# Patient Record
Sex: FEMALE | Race: WHITE | NOT HISPANIC OR LATINO | Employment: OTHER | URBAN - METROPOLITAN AREA
[De-identification: names, ages, dates, MRNs, and addresses within clinical notes are randomized per-mention and may not be internally consistent; named-entity substitution may affect disease eponyms.]

---

## 2022-09-25 ENCOUNTER — HOSPITAL ENCOUNTER (EMERGENCY)
Facility: CLINIC | Age: 72
Discharge: HOME | End: 2022-09-25
Attending: PHYSICIAN ASSISTANT | Admitting: PHYSICIAN ASSISTANT
Payer: MEDICARE

## 2022-09-25 ENCOUNTER — HOSPITAL ENCOUNTER (EMERGENCY)
Facility: CLINIC | Age: 72
Discharge: HOME | End: 2022-09-25
Attending: EMERGENCY MEDICINE | Admitting: EMERGENCY MEDICINE
Payer: MEDICARE

## 2022-09-25 VITALS
BODY MASS INDEX: 22.68 KG/M2 | HEART RATE: 97 BPM | WEIGHT: 128 LBS | SYSTOLIC BLOOD PRESSURE: 107 MMHG | TEMPERATURE: 98.1 F | DIASTOLIC BLOOD PRESSURE: 65 MMHG | OXYGEN SATURATION: 96 % | HEIGHT: 63 IN | RESPIRATION RATE: 18 BRPM

## 2022-09-25 VITALS
SYSTOLIC BLOOD PRESSURE: 118 MMHG | TEMPERATURE: 98.1 F | DIASTOLIC BLOOD PRESSURE: 57 MMHG | OXYGEN SATURATION: 98 % | HEART RATE: 109 BPM | RESPIRATION RATE: 16 BRPM

## 2022-09-25 DIAGNOSIS — R04.0 EPISTAXIS: ICD-10-CM

## 2022-09-25 DIAGNOSIS — R04.0 NASAL BLEEDING: ICD-10-CM

## 2022-09-25 LAB
BASOPHILS # BLD AUTO: 0.1 10E3/UL (ref 0–0.2)
BASOPHILS NFR BLD AUTO: 1 %
EOSINOPHIL # BLD AUTO: 0 10E3/UL (ref 0–0.7)
EOSINOPHIL NFR BLD AUTO: 0 %
ERYTHROCYTE [DISTWIDTH] IN BLOOD BY AUTOMATED COUNT: 14.7 % (ref 10–15)
HCT VFR BLD AUTO: 37.7 % (ref 35–47)
HGB BLD-MCNC: 12.3 G/DL (ref 11.7–15.7)
IMM GRANULOCYTES # BLD: 0 10E3/UL
IMM GRANULOCYTES NFR BLD: 0 %
LYMPHOCYTES # BLD AUTO: 1.5 10E3/UL (ref 0.8–5.3)
LYMPHOCYTES NFR BLD AUTO: 20 %
MCH RBC QN AUTO: 29.8 PG (ref 26.5–33)
MCHC RBC AUTO-ENTMCNC: 32.6 G/DL (ref 31.5–36.5)
MCV RBC AUTO: 91 FL (ref 78–100)
MONOCYTES # BLD AUTO: 0.7 10E3/UL (ref 0–1.3)
MONOCYTES NFR BLD AUTO: 9 %
NEUTROPHILS # BLD AUTO: 5.5 10E3/UL (ref 1.6–8.3)
NEUTROPHILS NFR BLD AUTO: 70 %
NRBC # BLD AUTO: 0 10E3/UL
NRBC BLD AUTO-RTO: 0 /100
PLATELET # BLD AUTO: 264 10E3/UL (ref 150–450)
RBC # BLD AUTO: 4.13 10E6/UL (ref 3.8–5.2)
WBC # BLD AUTO: 7.9 10E3/UL (ref 4–11)

## 2022-09-25 PROCEDURE — 999N000104 HC STATISTIC NO CHARGE

## 2022-09-25 PROCEDURE — 250N000013 HC RX MED GY IP 250 OP 250 PS 637: Performed by: EMERGENCY MEDICINE

## 2022-09-25 PROCEDURE — 99284 EMERGENCY DEPT VISIT MOD MDM: CPT | Mod: 25

## 2022-09-25 PROCEDURE — 36415 COLL VENOUS BLD VENIPUNCTURE: CPT | Performed by: EMERGENCY MEDICINE

## 2022-09-25 PROCEDURE — 30901 CONTROL OF NOSEBLEED: CPT | Mod: LT

## 2022-09-25 PROCEDURE — 85025 COMPLETE CBC W/AUTO DIFF WBC: CPT | Performed by: EMERGENCY MEDICINE

## 2022-09-25 RX ORDER — TRAMADOL HYDROCHLORIDE 50 MG/1
50 TABLET ORAL ONCE
Status: COMPLETED | OUTPATIENT
Start: 2022-09-25 | End: 2022-09-25

## 2022-09-25 RX ORDER — ACETAMINOPHEN 500 MG
1000 TABLET ORAL ONCE
Status: DISCONTINUED | OUTPATIENT
Start: 2022-09-25 | End: 2022-09-26 | Stop reason: HOSPADM

## 2022-09-25 RX ORDER — ACETAMINOPHEN 325 MG/1
650 TABLET ORAL ONCE
Status: COMPLETED | OUTPATIENT
Start: 2022-09-25 | End: 2022-09-25

## 2022-09-25 RX ADMIN — ACETAMINOPHEN 650 MG: 325 TABLET, FILM COATED ORAL at 13:03

## 2022-09-25 RX ADMIN — TRAMADOL HYDROCHLORIDE 50 MG: 50 TABLET, COATED ORAL at 22:38

## 2022-09-25 ASSESSMENT — ACTIVITIES OF DAILY LIVING (ADL)
ADLS_ACUITY_SCORE: 33
ADLS_ACUITY_SCORE: 33
ADLS_ACUITY_SCORE: 35

## 2022-09-25 ASSESSMENT — ENCOUNTER SYMPTOMS: HEADACHES: 1

## 2022-09-25 NOTE — ED PROVIDER NOTES
"  History   Chief Complaint:  Epistaxis       HPI   Kathy Peñaloza is a 72 year old female, on Eliquis, with history of  CAD, A-fib, and hypertension who presents with a constant nose bleed for the past 3 weeks. She presented to the ED about 2 hours ago and was evaluated by Dr. Boggs. A Miracell was placed into the left nares earlier today which was tolerated well by the patient. She presented to the ED again because her bleeding started again and it is getting worse. She does not feel comfortable going home. She has had multiple ENT appointments and has seen many doctors and she is not able to tolerate anymore. She has not taken her Eliquis for a couple days.     Review of Systems   HENT: Positive for nosebleeds.    All other systems reviewed and are negative.    Allergies:  Diphtheria,Pertussis (Acellular),Tetanus Vaccine  Hydrocodone-Acetaminophen  Pineapple  Pneumococcal vaccine  Zoster vaccine live  Ivp Dye [Contrast Dye]  Morphine Hcl  Gabapentin  Lactose     Medications:  Alprazolam  Aspirin 81 mg  Flaxseed, Linseed,  Fluticasone nasal spray  Glucosamine-Chondroitin  Hydrochlorothiazide  Losartan  Rosuvastatin  Pantoprazole  Metoprolol succinate  Spironolactone  Apixaban  Hydroxyzine HCL  Furosemide  Clopidogrel     Past Medical History:     Hypertension  Aortic valve disorder  Irregular heart rhythm  Anxiety  MAGDA  Aneurysm  Hyperlipidemia  Osteoarthritis  GERD  CAD  Arthritis     Past Surgical History:    Mastectomy bilateral  IR angiogram  EGD  Laparoscopic cholecystectomy     Family History:    Mother: CAD  Father: CAD, diabetes  Siblings: CAD x2     Social History:  The patient presents to the ED with   PCP: Negrito Cevallos     Physical Exam     Patient Vitals for the past 24 hrs:   BP Temp Temp src Pulse Resp SpO2 Height Weight   09/25/22 1748 107/65 98.1  F (36.7  C) Oral 97 18 96 % 1.6 m (5' 3\") 58.1 kg (128 lb)       Physical Exam   General: Well appearing, pleasant, resting on exam " bed  HEENT: No evidence of trauma.  Conjunctive are clear. Neck range of motion intact.  Nose and throat clear. Balloon in left nare. No bleeding in posterior oropharynx. Airway intact.  Respiratory: Good effort  Cardiovascular: Good distal perfusion  Gastrointestinal: Nondistended  Musculoskeletal: Atraumatic  Skin: Exposed skin clear.  Neurologic: Alert.  Psych:  Patient is cooperative, with normal affect.    Emergency Department Course     Emergency Department Course:     Reviewed:  I reviewed nursing notes, vitals, past medical history and Care Everywhere    Assessments:  1818 I obtained history and examined the patient as noted above.     1836 I rechecked the patient and explained findings.     Disposition:  The patient was discharged to home.     Impression & Plan     Medical Decision Making:  Kathy Peñaloza is a 72 year old female presents emergency room today for evaluation of nosebleed which has been intermittent for the past 3 weeks and return since she was home from the emergency room today.  See HPI.  Her vitals are unremarkable.  She was here earlier today and had a normal hemoglobin.  She has a balloon in place that was placed today.  She has ENT follow-up tomorrow.  There is no indication for admission currently.  She has been holding her Eliquis.  She is to return with new or worsening symptoms.  She is quite frustrated unfortunately however I do not feel removing the balloon or any other intervention is indicated currently.  Her airway is intact.    Diagnosis:    ICD-10-CM    1. Epistaxis  R04.0        Scribe Disclosure:  Nolvia YEBOAH, am serving as a scribe at 5:47 PM on 9/25/2022 to document services personally performed by Maxi Cosby PA-C based on my observations and the provider's statements to me.          Maxi Cosby PA-C  09/25/22 5838

## 2022-09-25 NOTE — ED TRIAGE NOTES
Third nose bleed this morning. Pt reports this has been going on for three weeks. On 2x thinners.     Triage Assessment     Row Name 09/25/22 1013       Triage Assessment (Adult)    Airway WDL WDL       Skin Circulation/Temperature WDL    Skin Circulation/Temperature WDL WDL       Cognitive/Neuro/Behavioral WDL    Cognitive/Neuro/Behavioral WDL WDL

## 2022-09-25 NOTE — ED PROVIDER NOTES
History   Chief Complaint:  Epistaxis       The history is provided by the patient.      Kathy Peñaloza is a 72 year old female with history of CAD, A-fib, and hypertension who presents with a nose bleed that she has been experiencing for 3 weeks. The bleeding is on the left side of her nose. She is also experiencing congestion and a headache. She presents to the ED because this is her 3rd nose bleed today. She has been seen multiple times for this ongoing issue and has seen and ENT specialist. She has an appointment with ENT tomorrow.    Review of Systems   HENT: Positive for congestion and nosebleeds.    Neurological: Positive for headaches.   All other systems reviewed and are negative.    Allergies:  Diphtheria,Pertussis (Acellular),Tetanus Vaccine  Hydrocodone-Acetaminophen  Pineapple  Pneumococcal vaccine  Zoster vaccine live  Ivp Dye [Contrast Dye]  Morphine Hcl  Gabapentin  Lactose    Medications:  Alprazolam  Aspirin 81 mg  Flaxseed, Linseed,  Fluticasone nasal spray  Glucosamine-Chondroitin  Hydrochlorothiazide  Losartan  Rosuvastatin  Pantoprazole  Metoprolol succinate  Spironolactone  Apixaban  Hydroxyzine HCL  Furosemide  Clopidogrel    Past Medical History:     Hypertension  Aortic valve disorder  Irregular heart rhythm  Anxiety  MAGDA  Aneurysm  Hyperlipidemia  Osteoarthritis  GERD  CAD  Arthritis    Past Surgical History:    Mastectomy bilateral  IR angiogram  EGD  Laparoscopic cholecystectomy    Family History:    Mother: CAD  Father: CAD, diabetes  Siblings: CAD x2    Social History:  The patient presents to the ED with her .  PCP: Negrito Cevallos     Physical Exam     Patient Vitals for the past 24 hrs:   BP Temp Temp src Pulse Resp SpO2   09/25/22 1057 118/57 -- -- 109 16 98 %   09/25/22 1014 131/74 98.1  F (36.7  C) Temporal 95 16 97 %       Physical Exam  Middle age white female sitting, anxious, no respiratory distress with nasal clamp on.  Head/neck: no facial tenderness, neck  supple, no adenopathy, oral pharynx clear.  Neuro: awake, alert, appropriate.    Emergency Department Course     Procedures  Nasal packing   I placed a 7.5 cm Miracell into the left nares, patient tolerated this well.    Emergency Department Course:     Reviewed:  I reviewed nursing notes, vitals, past medical history and Care Everywhere    Assessments:  1203 I obtained history and examined the patient as noted above.   1214 I performed the procedure.  1335 I rechecked the patient and explained treatment plan.    Interventions:  1303 Acetaminophen 650 mg PO    Disposition:  The patient was discharged to home.     Impression & Plan     Medical Decision Making:  This is a 72 year old with nose bleeds. She has had three nose bleeds today and has been experiencing them for the past several of weeks. She has been seen in the ER and ENT. She did have an MI and stent recently and is on Plavix and Eliquis because of previous paroxysmal A-fib. On exam now, there was no active bleeding. When I looked in her nose, I did not see any anterior lesions or any posterior blood. When I had her blow her nose, there was just a slight amount of blood tinge on the nose, there was none on the tissue, there was no oropharynx bleeding. I discussed with her that without any posterior or anterior bleeding now options were to simply watch or to place a posterior pad. She preferred to have something done. Patient is going to see her ENT tomorrow because this will only be in for one day, I have not started her on antibiotics. She will follow up tomorrow if she has recurrent bleeding, she will need to return to the ED. I will have her hold her Eliquis for the time being since this is not a valvular problem.     Diagnosis:    ICD-10-CM    1. Nasal bleeding  R04.0      Scribe Disclosure:  I, Tong Craig, am serving as a scribe at 12:03 PM on 9/25/2022 to document services personally performed by Chaka Boggs MD based on my observations and  the provider's statements to me.        Chaka Boggs MD  09/25/22 0519

## 2022-09-25 NOTE — ED TRIAGE NOTES
Patient was seen three hours ago for a nose bleed and told to return if it began to bleed again. Balloon is in place, actively bleeding.      Triage Assessment     Row Name 09/25/22 5212       Triage Assessment (Adult)    Airway WDL WDL       Respiratory WDL    Respiratory WDL WDL       Skin Circulation/Temperature WDL    Skin Circulation/Temperature WDL WDL       Cardiac WDL    Cardiac WDL WDL       Peripheral/Neurovascular WDL    Peripheral Neurovascular WDL WDL

## 2022-09-25 NOTE — ED PROVIDER NOTES
History   Chief Complaint:  Epistaxis       HPI   Kathy Peñaloza is a 72 year old female with history of *** who presents with ***.    Heart attack 3 weeks ago placed on 2 thinners after 3 stents placed  Silver nitrate made it worst  Increasing in frequency  Light headed +  Nauseous +  ENT appointment tomorrow      Review of Systems  ***    Allergies:  {EPPAF Allergies:938104}  Ivp Dye [Contrast Dye]  Morphine Hcl    Medications:  ***  ALPRAZolam (XANAX) 0.5 MG tablet  aspirin 81 MG tablet  Flaxseed, Linseed, (FLAX SEEDS PO)  fluticasone (FLONASE) 50 MCG/ACT nasal spray  Glucosamine-Chondroitin (GLUCOSAMINE CHONDR COMPLEX PO)  hydrochlorothiazide 12.5 MG TABS  losartan (COZAAR) 100 MG tablet  rosuvastatin (CRESTOR) 5 MG tablet        Past Medical History:     {EPPAFVMEDHX:107936}  Patient Active Problem List   Diagnosis     Hypertension goal BP (blood pressure) < 140/90     CARDIOVASCULAR SCREENING; LDL GOAL LESS THAN 130     Aortic valve disorder     Injury of right elbow     Advanced directives, counseling/discussion     Right Lateral epicondylitis     Personal history of malignant neoplasm of breast     Irregular heart rhythm     Primary localized osteoarthrosis, other specified sites     Chest pain     Anxiety        Past Surgical History:    {EPPAFV Surgicalhx:458897}  Past Surgical History:   Procedure Laterality Date     MASTECTOMY BILATERAL, INSERT TISSUE EXPANDER BILATERAL, COMBINED          Family History:    {EPPAFV famhx:518120}  Family History   Problem Relation Age of Onset     Heart Disease Mother      Heart Disease Father      Diabetes Father      Cerebrovascular Disease Brother      Heart Disease Brother      Heart Disease Sister        Social History:  {SOC HX:615470}  PCP: Negrito Cevallos   ***    Physical Exam     Patient Vitals for the past 24 hrs:   BP Temp Temp src Pulse Resp SpO2   09/25/22 1057 118/57 -- -- 109 16 98 %   09/25/22 1014 131/74 98.1  F (36.7  C) Temporal 95 16 97 %  "      Physical Exam  ***    Emergency Department Course   ECG  No results found for this or any previous visit.    Imaging:  No orders to display     Report per {read:376917}    Laboratory:  Labs Ordered and Resulted from Time of ED Arrival to Time of ED Departure - No data to display     Procedures  ***    Emergency Department Course:           Reviewed:  I reviewed {EDreview:655144}    Assessments:  *** I obtained history and examined the patient as noted above.   *** I rechecked the patient*** and explained findings***.   ***    Consults:  ***    Interventions:  ***    [facility]  [facility]    Impression & Plan     CMS Diagnoses: {Sepsis/Septic Shock/Stemi/Stroke:212787::\"None\"}  {FVTrauma:769074}    {[facility]:543814}      Medical Decision Making:  ***     Critical Care Time: was *** minutes for this patient excluding procedures    Diagnosis:  No diagnosis found.    Discharge Medications:  New Prescriptions    No medications on file       Scribe Disclosure:  I, Tong Craig, am serving as a scribe at 11:21 AM on 9/25/2022 to document services personally performed by Chaka Boggs MD*** based on my observations and the provider's statements to me.     ***    "

## 2022-09-26 NOTE — ED NOTES
Patient was discharged from fast track 10 minutes ago. Per patient, this is her 3rd visit to ER today for the same thing. Pt has ENT appointment tomorrow

## 2022-09-26 NOTE — ED PROVIDER NOTES
History   Chief Complaint:  Epistaxis    The history is provided by the patient.      Kathy Peñaloza is a 72 year old female on Plavix and Eliquis with history of STEMI, hypertension, anxiety who presents with resolved epistaxis. Kathy has been seen here in the [facility] emergency department twice today by Maxi Cosby PA-C and Chaka Boggs MD for epistaxis. Her bleeding subsided without packing after her first visit but returned as the bleeding re-began. Upon her second visit, her epistaxis was stopped via a nasal balloon. She explains that her nose bleed returned while on her way back to her hotel. Kathy describes a sensation of a blood clot in the back of her throat. Kathy shares concerns with taking Plavix following her STEMI one month ago. Since beginning Plavix one month ago, she has had chronic epistaxis for the past three weeks. First cardiology appointment in four days. She spoke with her internist regarding the Plavix and her nose bleeds; they suggested missing one dose then restarting her Plavix. Kathy feels that nose bleeds always begin pouring blood a couple hours after taking her Plavix. Her last dose of Plavix was twelve hours ago. She has been seeing an ENT for the past few weeks and has an appointment at 7:30 in the morning tomorrow.  She is also on Eliquis but has been holding this for the past few days.    Review of Systems   HENT: Positive for nosebleeds.    All other systems reviewed and are negative.    Allergies:  Diphtheria,Pertussis (Acellular),Tetanus Vaccine  Hydrocodone-Acetaminophen  Pineapple  Pneumococcal vaccine  Zoster vaccine live  Ivp Dye [Contrast Dye]  Morphine Hcl  Gabapentin  Lactose     Medications:  Alprazolam  Aspirin 81 mg  Flaxseed, Linseed,  Fluticasone nasal spray  Glucosamine-Chondroitin  Hydrochlorothiazide  Losartan  Rosuvastatin  Pantoprazole  Metoprolol succinate  Spironolactone  Apixaban  Hydroxyzine HCL  Furosemide  Clopidogrel     Past Medical  "History:     Hypertension  Aortic valve disorder  Irregular heart rhythm  Anxiety  MAGDA  Aneurysm  Hyperlipidemia  Osteoarthritis  GERD  CAD  Arthritis  STEMI     Past Surgical History:    Mastectomy bilateral  IR angiogram  EGD  Laparoscopic cholecystectomy     Family History:    Mother: CAD  Father: CAD, diabetes  Siblings: CAD x2     Social History:  The patient presents to the ED with   PCP: Jessica Lee MD    Physical Exam     Patient Vitals for the past 24 hrs:   BP Temp Temp src Pulse Resp SpO2 Height Weight   09/25/22 1748 107/65 98.1  F (36.7  C) Oral 97 18 96 % 1.6 m (5' 3\") 58.1 kg (128 lb)     Physical Exam  Vitals and nursing note reviewed.   Constitutional:       General: She is not in acute distress.     Appearance: She is not ill-appearing.   HENT:      Head: Normocephalic and atraumatic.      Right Ear: External ear normal.      Left Ear: External ear normal.      Nose:      Left Nostril: Foreign body (Rhino Rocket balloon present in the left nostril with small amount of serosanguineous oozing anteriorly) present.   Eyes:      Extraocular Movements: Extraocular movements intact.      Conjunctiva/sclera: Conjunctivae normal.   Pulmonary:      Effort: Pulmonary effort is normal. No respiratory distress.   Musculoskeletal:         General: No deformity or signs of injury.   Skin:     Coloration: Skin is not pale.      Findings: No rash.   Neurological:      Mental Status: She is alert.   Psychiatric:         Mood and Affect: Mood is anxious.         Behavior: Behavior normal.           Emergency Department Course   [facility]    -Epistaxis Mgmt    Date/Time: 9/25/2022 9:29 PM  Performed by: Rajiv Joel MD  Authorized by: Rajiv Joel MD     Risks, benefits and alternatives discussed.      ANESTHESIA (see MAR for exact dosages)     Anesthesia method:  None  PROCEDURE DETAILS     Treatment site:  L anterior    Treatment method:  Nasal tampon (7.5)    " Treatment episode: recurrence of recent bleed        POST PROCEDURE     Assessment:  Bleeding stopped    PROCEDURE    Patient Tolerance:  Patient tolerated the procedure well with no immediate complications    Emergency Department Course:       Reviewed:  I reviewed nursing notes, vitals, past medical history and Care Everywhere    Assessments:   I obtained history and examined the patient as noted above. I explained findings and discussed discharge with the patient. All questions answered.    I rechecked the patient.    I rechecked the patient. Kathy's nosebleed returned.    I completed the procedure at noted above.    I rechecked the patient.     Disposition:  The patient was discharged to home.     Impression & Plan     Medical Decision Makin-year-old female with recurrent epistaxis.  She has been seen twice in this emergency department already.  She had a 7 and half centimeter Rhino Rocket balloon placed on her last visit.  This initially stopped the bleeding but she had return of bleeding after being discharged.  On arrival she had just oozing from the anterior part of the left nostril.  I added approximately 2 mL of air to the balloon and we observed the patient in the ED.  She did have an episode of heavier bleeding briefly.  This appeared to come from the anterior nostril.  The prior Rhino Rocket balloon was placed fairly deeply so the anterior part of the septum was exposed.  Suspect this may be the source of the bleeding.  With patient's permission, I have remove the old balloon and placed a new 7 and half centimeter Rhino Rocket into the left nostril.  I did not advance the balloon quite as far as the previous one and was able to tamponade the area of concern.  I observed her in the ED for period of time after this and there is no further bleeding noted.  I encouraged her to follow-up with ENT as she is planning to in the morning.  Recommend she continue to hold her Eliquis.  I  would recommend that she continue to take her Plavix given her recent STEMI.    Diagnosis:    ICD-10-CM    1. Epistaxis  R04.0      Scribe Disclosure:  I, Veronica Gurrola, am serving as a scribe at 7:50 PM on 9/25/2022 to document services personally performed by Rajiv Joel MD based on my observations and the provider's statements to me.      Rajiv Joel MD  09/25/22 6948

## 2022-09-27 ENCOUNTER — HOSPITAL ENCOUNTER (EMERGENCY)
Facility: CLINIC | Age: 72
Discharge: HOME | End: 2022-09-27
Attending: EMERGENCY MEDICINE | Admitting: EMERGENCY MEDICINE
Payer: MEDICARE

## 2022-09-27 VITALS
OXYGEN SATURATION: 98 % | HEIGHT: 63 IN | TEMPERATURE: 98.5 F | SYSTOLIC BLOOD PRESSURE: 128 MMHG | DIASTOLIC BLOOD PRESSURE: 91 MMHG | HEART RATE: 80 BPM | WEIGHT: 128 LBS | BODY MASS INDEX: 22.68 KG/M2 | RESPIRATION RATE: 15 BRPM

## 2022-09-27 DIAGNOSIS — Z48.00 ENCOUNTER FOR REMOVAL OF NASAL PACKING: ICD-10-CM

## 2022-09-27 PROCEDURE — 99282 EMERGENCY DEPT VISIT SF MDM: CPT

## 2022-09-27 NOTE — ED NOTES
Patient frustrated and requesting packing out. MD and RN explained that she needs to follow-up with ENT due to multiple episodes of bleeding including tonight even with her packing in place.

## 2022-09-27 NOTE — ED PROVIDER NOTES
History   Chief Complaint:  Nasal Problem     HPI   Kathy Peñaloza is a 72 year old female with a history of hypertension, CAD, and STEMI currently on Eliquis and Plavix who presents for evaluation of a nasal problem. On 9/25/22 the patient was seen here in the ED for a left-sided nosebleed at which time she had nasal packing performed. She stopped her Eliquis that day. She returned to the ED 2 days ago for rebleed and had a nasal balloon placed. Tonight she has had increased pain and pressure to the nose from the packing. She did also have a little bleeding through the packing whih spontaneously resolved.  Due to concern for this increased pain she came into the ED for evaluation and desiring her packing to come out. She is currently on Keflex. She is currently scheduled to follow up with ENT on 9/30.     Review of Systems   HENT: Positive for nosebleeds.    All other systems reviewed and are negative.      Allergies:  Diphtheria,Pertussis (Acellular),Tetanus Vaccine  Hydrocodone-Acetaminophen  Pneumococcal vaccine  Zoster vaccine live  Contrast Dye  Morphine Hcl  Gabapentin  Lactose     Medications:  Alprazolam  Aspirin   Glucosamine-Chondroitin  Hydrochlorothiazide  Losartan  Rosuvastatin  Pantoprazole  Metoprolol succinate  Spironolactone  Apixaban  Hydroxyzine HCL  Furosemide  Clopidogrel  Keflex      Past Medical History:     Hypertension  Aortic valve disorder  Irregular heart rhythm  Anxiety  MAGDA  Aneurysm  Hyperlipidemia  Osteoarthritis  GERD  CAD  Arthritis  STEMI     Past Surgical History:    Mastectomy bilateral  IR angiogram  EGD  Laparoscopic cholecystectomy     Family History:    Mother: CAD  Father: CAD, diabetes  Siblings: CAD x2     Social History:  Marital status:   The patient presents to the ED accompanied by her .     Physical Exam     Patient Vitals for the past 24 hrs:   BP Temp Temp src Pulse Resp SpO2 Height Weight   09/27/22 0336 (!) 128/91 -- -- 80 15 98 % -- --  "  09/27/22 0142 123/57 98.5  F (36.9  C) Temporal 96 18 98 % 1.6 m (5' 3\") 58.1 kg (128 lb)       Physical Exam    Physical Exam   Constitutional:  Patient is oriented to person, place, and time. They appear well-developed and well-nourished. Mild distress secondary to nasal discomfort.   HENT:   Nose:    Nasal packing left anterior naris.  Mouth/Throat:   Oropharynx is clear and moist. No blood in posterior pharynx.  Eyes:    Conjunctivae normal and EOM are normal. Pupils are equal, round, and reactive to light. No tearing of the eyes.   Neck:    Normal range of motion.   Musculoskeletal:  Normal range of motion. Patient exhibits no edema.   Neurological:   Patient is alert and oriented to person, place, and time. Patient has normal strength. No cranial nerve deficit or sensory deficit. GCS 15  Skin:   Skin is warm and dry. No rash noted. No erythema.   Psychiatric:   Patient has a normal mood and affect. Patient's behavior is normal. Judgment and thought content normal.     Emergency Department Course     Emergency Department Course:     Reviewed:  I reviewed nursing notes, vitals and past medical history    Assessments:  0330:  I obtained history and examined the patient as noted above.     Disposition:  The patient was discharged to home.     Impression & Plan     CMS Diagnoses: None    Medical Decision Making:  Kathy Peñaloza is a 72-year-old woman presenting to the emergency department desiring her nasal packing removed.  He was placed on 925 after being seen 2 times previously for left nare bleeding.  It looks like she was initially packed with Merocel but bled through that so got a nasal balloon.  This nasal balloon has been in for less than 48 hours.  I do not see any active bleeding but being that she was here for 3 visits in the last 2 days I do not feel that it is appropriate to take the packing out.  It is uncomfortable so I did remove half a cc from the nasal balloon.  It was not fully inflated do to " the external balloon being soft.  I anticipate if we remove the balloon the bleeding would restart.  We would not have to replace that.  At this point she is currently on Keflex and she will follow-up with her ENT on Friday.     Diagnosis:    ICD-10-CM    1. Encounter for removal of nasal packing  Z48.00         Scribe Disclosure:  I, Shaun Mason, am serving as a scribe at 3:30 AM on 9/27/2022 to document services personally performed by Erin Michaud MD based on my observations and the provider's statements to me.           Erin Michaud MD  09/27/22 0346

## 2022-09-27 NOTE — ED TRIAGE NOTES
Triage Assessment     Row Name 09/27/22 0143       Triage Assessment (Adult)    Airway WDL WDL       Respiratory WDL    Respiratory WDL WDL       Skin Circulation/Temperature WDL    Skin Circulation/Temperature WDL WDL       Cardiac WDL    Cardiac WDL WDL       Peripheral/Neurovascular WDL    Peripheral Neurovascular WDL WDL       Cognitive/Neuro/Behavioral WDL    Cognitive/Neuro/Behavioral WDL WDL            Pt. Had epistaxis treated in ED here yesterday and now pt. Having burning discomfort from packing in the nostril. Pt.believes the bleeding has subsided.

## 2023-12-11 ENCOUNTER — HOSPITAL ENCOUNTER (OUTPATIENT)
Facility: CLINIC | Age: 73
End: 2023-12-11
Attending: INTERNAL MEDICINE | Admitting: INTERNAL MEDICINE
Payer: MEDICARE

## 2023-12-26 RX ORDER — SOTALOL HYDROCHLORIDE 80 MG/1
40 TABLET ORAL 2 TIMES DAILY
COMMUNITY
End: 2024-01-08 | Stop reason: HOSPADM